# Patient Record
Sex: FEMALE | Race: WHITE
[De-identification: names, ages, dates, MRNs, and addresses within clinical notes are randomized per-mention and may not be internally consistent; named-entity substitution may affect disease eponyms.]

---

## 2018-07-10 ENCOUNTER — HOSPITAL ENCOUNTER (OUTPATIENT)
Dept: HOSPITAL 76 - LAB | Age: 33
Discharge: HOME | End: 2018-07-10
Attending: FAMILY MEDICINE
Payer: COMMERCIAL

## 2018-07-10 DIAGNOSIS — E28.2: ICD-10-CM

## 2018-07-10 DIAGNOSIS — B00.9: ICD-10-CM

## 2018-07-10 DIAGNOSIS — K63.5: ICD-10-CM

## 2018-07-10 DIAGNOSIS — R10.9: Primary | ICD-10-CM

## 2018-07-10 LAB
ALBUMIN DIAFP-MCNC: 3.9 G/DL (ref 3.2–5.5)
ALBUMIN/GLOB SERPL: 1.1 {RATIO} (ref 1–2.2)
ALP SERPL-CCNC: 73 IU/L (ref 42–121)
ALT SERPL W P-5'-P-CCNC: 14 IU/L (ref 10–60)
AMYLASE SERPL-CCNC: 56 U/L (ref 28–100)
ANION GAP SERPL CALCULATED.4IONS-SCNC: 8 MMOL/L (ref 6–13)
AST SERPL W P-5'-P-CCNC: 22 IU/L (ref 10–42)
BASOPHILS NFR BLD AUTO: 0.1 10^3/UL (ref 0–0.1)
BASOPHILS NFR BLD AUTO: 0.7 %
BILIRUB BLD-MCNC: 0.4 MG/DL (ref 0.2–1)
BUN SERPL-MCNC: 16 MG/DL (ref 6–20)
CALCIUM UR-MCNC: 9.7 MG/DL (ref 8.5–10.3)
CHLORIDE SERPL-SCNC: 106 MMOL/L (ref 101–111)
CO2 SERPL-SCNC: 24 MMOL/L (ref 21–32)
CREAT SERPLBLD-SCNC: 0.9 MG/DL (ref 0.4–1)
EOSINOPHIL # BLD AUTO: 0.6 10^3/UL (ref 0–0.7)
EOSINOPHIL NFR BLD AUTO: 5.5 %
ERYTHROCYTE [DISTWIDTH] IN BLOOD BY AUTOMATED COUNT: 14.2 % (ref 12–15)
GFRSERPLBLD MDRD-ARVRAT: 72 ML/MIN/{1.73_M2} (ref 89–?)
GLOBULIN SER-MCNC: 3.5 G/DL (ref 2.1–4.2)
GLUCOSE SERPL-MCNC: 90 MG/DL (ref 70–100)
HCG UR QL: NEGATIVE
HGB UR QL STRIP: 11.8 G/DL (ref 12–16)
LIPASE SERPL-CCNC: 30 U/L (ref 22–51)
LYMPHOCYTES # SPEC AUTO: 2.1 10^3/UL (ref 1.5–3.5)
LYMPHOCYTES NFR BLD AUTO: 20.3 %
MCH RBC QN AUTO: 32.6 PG (ref 27–31)
MCHC RBC AUTO-ENTMCNC: 33.1 G/DL (ref 32–36)
MCV RBC AUTO: 98.5 FL (ref 81–99)
MONOCYTES # BLD AUTO: 0.6 10^3/UL (ref 0–1)
MONOCYTES NFR BLD AUTO: 6.3 %
NEUTROPHILS # BLD AUTO: 6.9 10^3/UL (ref 1.5–6.6)
NEUTROPHILS # SNV AUTO: 10.3 X10^3/UL (ref 4.8–10.8)
NEUTROPHILS NFR BLD AUTO: 67.2 %
PDW BLD AUTO: 8.5 FL (ref 7.9–10.8)
PLATELET # BLD: 300 10^3/UL (ref 130–450)
PROT SPEC-MCNC: 7.4 G/DL (ref 6.7–8.2)
RBC MAR: 3.62 10^6/UL (ref 4.2–5.4)
SODIUM SERPLBLD-SCNC: 138 MMOL/L (ref 135–145)
SP GR UR STRIP.AUTO: >=1.03 (ref 1–1.03)

## 2018-07-10 PROCEDURE — 81025 URINE PREGNANCY TEST: CPT

## 2018-07-10 PROCEDURE — 36415 COLL VENOUS BLD VENIPUNCTURE: CPT

## 2018-07-10 PROCEDURE — 82150 ASSAY OF AMYLASE: CPT

## 2018-07-10 PROCEDURE — 80053 COMPREHEN METABOLIC PANEL: CPT

## 2018-07-10 PROCEDURE — 83690 ASSAY OF LIPASE: CPT

## 2018-07-10 PROCEDURE — 85025 COMPLETE CBC W/AUTO DIFF WBC: CPT

## 2018-07-12 ENCOUNTER — HOSPITAL ENCOUNTER (EMERGENCY)
Dept: HOSPITAL 76 - ED | Age: 33
Discharge: HOME | End: 2018-07-12
Payer: COMMERCIAL

## 2018-07-12 VITALS — SYSTOLIC BLOOD PRESSURE: 118 MMHG | DIASTOLIC BLOOD PRESSURE: 78 MMHG

## 2018-07-12 DIAGNOSIS — R51: ICD-10-CM

## 2018-07-12 DIAGNOSIS — E87.1: Primary | ICD-10-CM

## 2018-07-12 LAB
ANION GAP SERPL CALCULATED.4IONS-SCNC: 9 MMOL/L (ref 6–13)
BASOPHILS NFR BLD AUTO: 0.1 10^3/UL (ref 0–0.1)
BASOPHILS NFR BLD AUTO: 1.1 %
BUN SERPL-MCNC: 18 MG/DL (ref 6–20)
CALCIUM UR-MCNC: 9 MG/DL (ref 8.5–10.3)
CHLORIDE SERPL-SCNC: 99 MMOL/L (ref 101–111)
CO2 SERPL-SCNC: 25 MMOL/L (ref 21–32)
CREAT SERPLBLD-SCNC: 0.8 MG/DL (ref 0.4–1)
EOSINOPHIL # BLD AUTO: 0.5 10^3/UL (ref 0–0.7)
EOSINOPHIL NFR BLD AUTO: 5.2 %
ERYTHROCYTE [DISTWIDTH] IN BLOOD BY AUTOMATED COUNT: 13.6 % (ref 12–15)
GFRSERPLBLD MDRD-ARVRAT: 83 ML/MIN/{1.73_M2} (ref 89–?)
GLUCOSE SERPL-MCNC: 96 MG/DL (ref 70–100)
HGB UR QL STRIP: 11.5 G/DL (ref 12–16)
INR PPP: 1.1 (ref 0.8–1.2)
LYMPHOCYTES # SPEC AUTO: 2.1 10^3/UL (ref 1.5–3.5)
LYMPHOCYTES NFR BLD AUTO: 21.6 %
MCH RBC QN AUTO: 32.3 PG (ref 27–31)
MCHC RBC AUTO-ENTMCNC: 33.6 G/DL (ref 32–36)
MCV RBC AUTO: 96.2 FL (ref 81–99)
MONOCYTES # BLD AUTO: 0.6 10^3/UL (ref 0–1)
MONOCYTES NFR BLD AUTO: 5.9 %
NEUTROPHILS # BLD AUTO: 6.3 10^3/UL (ref 1.5–6.6)
NEUTROPHILS # SNV AUTO: 9.5 X10^3/UL (ref 4.8–10.8)
NEUTROPHILS NFR BLD AUTO: 66.2 %
PDW BLD AUTO: 8.6 FL (ref 7.9–10.8)
PLATELET # BLD: 279 10^3/UL (ref 130–450)
PROTHROM ACT/NOR PPP: 12.4 SECS (ref 9.9–12.6)
RBC MAR: 3.56 10^6/UL (ref 4.2–5.4)
SODIUM SERPLBLD-SCNC: 133 MMOL/L (ref 135–145)

## 2018-07-12 PROCEDURE — 85610 PROTHROMBIN TIME: CPT

## 2018-07-12 PROCEDURE — 99283 EMERGENCY DEPT VISIT LOW MDM: CPT

## 2018-07-12 PROCEDURE — 80048 BASIC METABOLIC PNL TOTAL CA: CPT

## 2018-07-12 PROCEDURE — 85025 COMPLETE CBC W/AUTO DIFF WBC: CPT

## 2018-07-12 PROCEDURE — 70450 CT HEAD/BRAIN W/O DYE: CPT

## 2018-07-12 PROCEDURE — 36415 COLL VENOUS BLD VENIPUNCTURE: CPT

## 2018-07-12 NOTE — CT REPORT
Procedure Date:  07/12/2018   

Accession Number:  [id number] / [id number]                    

Procedure:  CT  - Head W/O CPT Code:  

 

FULL RESULT:

 

 

EXAM:

CT HEAD

 

EXAM DATE: 7/12/2018 06:23 PM.

 

CLINICAL HISTORY: Headache.

 

COMPARISON: None.

 

TECHNIQUE: Multiaxial CT images were obtained from the foramen magnum to 

the vertex. Reformats: Coronal. IV contrast: None.

 

In accordance with CT protocol optimization, one or more of the following 

dose reduction techniques were utilized for this exam: automated exposure 

control, adjustment of mA and/or KV based on patient size, or use of 

iterative reconstructive technique.

 

FINDINGS:

Parenchyma: No intraparenchymal hemorrhage. No evidence of mass, midline 

shift, or CT findings of infarction. Gray-white differentiation is 

distinct.

 

Extraaxial Spaces: Normal for age. No subdural or epidural collections 

identified.

 

Ventricles: Normal in size and position.

 

Sinuses and Orbits: Imaged paranasal sinuses, orbits, and mastoids show 

no significant abnormality.

 

Bones: No evidence of fracture or calvarial defect.

 

Other: None.

IMPRESSION: Normal head CT.

 

[facility]

## 2018-07-12 NOTE — ED PHYSICIAN DOCUMENTATION
History of Present Illness





- Stated complaint


Stated Complaint: POSS HEAT EX/HEADACHE





- Chief complaint


Chief Complaint: General





- History obtained from


History obtained from: Patient





- History of Present Illness


Timing: Today (33-year-old woman with history of PCO S was driving around in 

her car today and started to feel sick with bilateral head pressure and neck 

tension.  She felt dizzy and weak.  Of note she has been feeling headachy for 

the last several months, worse over the last couple of weeks.  The headaches 

have been intermittent, without a particular pattern to them.  They are 

sometimes sudden and sometimes gradual in onset.  She also notes that she is on 

her menses and it is a little heavier than normal.  She saw a walk-in clinic 

physician 2 days ago and had labs done which were notable for hemoglobin of 10-1

/2 or so but otherwise negative.  She had a negative pregnancy test then.)





Review of Systems


Constitutional: reports: Fatigue.  denies: Fever, Chills


Nose: denies: Rhinorrhea / runny nose, Congestion


Throat: denies: Sore throat


GI: reports: Abdominal Pain (Left-sided).  denies: Nausea





PD PAST MEDICAL HISTORY





- Present Medications


Home Medications: 


 Ambulatory Orders











 Medication  Instructions  Recorded  Confirmed


 


Cyclobenzaprine [Flexeril] 10 mg PO TID PRN #20 tablet 07/12/18 














- Allergies


Allergies/Adverse Reactions: 


 Allergies











Allergy/AdvReac Type Severity Reaction Status Date / Time


 


Sulfa (Sulfonamide Allergy  Unknown Verified 07/12/18 17:11





Antibiotics)     














PD ED PE NORMAL





- Vitals


Vital signs reviewed: Yes





- General


General: Alert and oriented X 3, No acute distress





- HEENT


HEENT: PERRL, EOMI





- Neck


Neck: Supple, no meningeal sign, No bony TTP





- Cardiac


Cardiac: RRR, No murmur





- Respiratory


Respiratory: No respiratory distress, Clear bilaterally





- Abdomen


Abdomen: Normal bowel sounds, Soft, Non tender





- Back


Back: No CVA TTP, No spinal TTP





- Extremities


Extremities: No edema, No calf tenderness / cord





- Neuro


Neuro: Alert and oriented X 3, Normal speech





Results





- Vitals


Vitals: 


 Vital Signs - 24 hr











  07/12/18 07/12/18





  17:09 18:34


 


Temperature 36.9 C 36.8 C


 


Heart Rate 66 65


 


Respiratory 16 16





Rate  


 


Blood Pressure 120/86 H 118/78


 


O2 Saturation 98 98








 Oxygen











O2 Source                      Room air

















- Labs


Labs: 


 Laboratory Tests











  07/12/18 07/12/18 07/12/18





  17:41 17:41 17:41


 


WBC  9.5  


 


RBC  3.56 L  


 


Hgb  11.5 L  


 


Hct  34.2 L  


 


MCV  96.2  


 


MCH  32.3 H  


 


MCHC  33.6  


 


RDW  13.6  


 


Plt Count  279  


 


MPV  8.6  


 


Neut # (Auto)  6.3  


 


Lymph # (Auto)  2.1  


 


Mono # (Auto)  0.6  


 


Eos # (Auto)  0.5  


 


Baso # (Auto)  0.1  


 


Absolute Nucleated RBC  0.01  


 


Nucleated RBC %  0.1  


 


PT   12.4 


 


INR   1.1 


 


Sodium    133 L


 


Potassium    3.3 L


 


Chloride    99 L


 


Carbon Dioxide    25


 


Anion Gap    9.0


 


BUN    18


 


Creatinine    0.8


 


Estimated GFR (MDRD)    83 L


 


Glucose    96


 


Calcium    9.0














- Rads (name of study)


  ** CT Head


Radiology: EMP read contemporaneously (normal)





PD MEDICAL DECISION MAKING





- ED course


ED course: 





She is worried about a heat related injury, however the low normal vital signs 

and lack of response to oral fluids argue against this.  She was a little 

anemic the other day and she has ongoing, we will recheck this and do a CT of 

her head.  The pattern of months worth of headaches argue against an infectious 

etiology but mass lesion is on the differential.





Her sodium did go down her H&H went up since the other day, this could be from 

mild dehydration or from excess water use which more fits the history.  She was 

advised to cut back on free water a little bit and follow-up with her primary 

care physician.





- Sepsis Event


Vital Signs: 


 Vital Signs - 24 hr











  07/12/18 07/12/18





  17:09 18:34


 


Temperature 36.9 C 36.8 C


 


Heart Rate 66 65


 


Respiratory 16 16





Rate  


 


Blood Pressure 120/86 H 118/78


 


O2 Saturation 98 98








 Oxygen











O2 Source                      Room air

















Departure





- Departure


Disposition: 01 Home, Self Care


Clinical Impression: 


 Hyponatremia





Headache


Qualifiers:


 Headache type: tension-type Headache chronicity pattern: acute headache 

Intractability: not intractable Qualified Code(s): G44.209 - Tension-type 

headache, unspecified, not intractable





Condition: Good


Record reviewed to determine appropriate education?: Yes


Instructions:  ED Cephalgia Unspecified


Prescriptions: 


Cyclobenzaprine [Flexeril] 10 mg PO TID PRN #20 tablet


 PRN Reason: Pain


Comments: 


Call your doctor to arrange a follow-up appointment, make the next available 

appointment.  In the interim, return anytime if worse or if new symptoms 

develop.